# Patient Record
Sex: FEMALE | Race: WHITE
[De-identification: names, ages, dates, MRNs, and addresses within clinical notes are randomized per-mention and may not be internally consistent; named-entity substitution may affect disease eponyms.]

---

## 2019-07-11 ENCOUNTER — HOSPITAL ENCOUNTER (OUTPATIENT)
Dept: HOSPITAL 75 - PREOP | Age: 44
Discharge: HOME | End: 2019-07-11
Attending: SURGERY
Payer: COMMERCIAL

## 2019-07-11 DIAGNOSIS — Z01.818: Primary | ICD-10-CM

## 2019-07-24 ENCOUNTER — HOSPITAL ENCOUNTER (OUTPATIENT)
Dept: HOSPITAL 75 - ENDO | Age: 44
Discharge: HOME | End: 2019-07-24
Attending: SURGERY
Payer: COMMERCIAL

## 2019-07-24 VITALS — DIASTOLIC BLOOD PRESSURE: 84 MMHG | SYSTOLIC BLOOD PRESSURE: 134 MMHG

## 2019-07-24 VITALS — DIASTOLIC BLOOD PRESSURE: 74 MMHG | SYSTOLIC BLOOD PRESSURE: 128 MMHG

## 2019-07-24 VITALS — DIASTOLIC BLOOD PRESSURE: 79 MMHG | SYSTOLIC BLOOD PRESSURE: 129 MMHG

## 2019-07-24 VITALS — DIASTOLIC BLOOD PRESSURE: 86 MMHG | SYSTOLIC BLOOD PRESSURE: 118 MMHG

## 2019-07-24 VITALS — SYSTOLIC BLOOD PRESSURE: 134 MMHG | DIASTOLIC BLOOD PRESSURE: 74 MMHG

## 2019-07-24 VITALS — SYSTOLIC BLOOD PRESSURE: 130 MMHG | DIASTOLIC BLOOD PRESSURE: 77 MMHG

## 2019-07-24 VITALS — HEIGHT: 64 IN | BODY MASS INDEX: 26.46 KG/M2 | WEIGHT: 155 LBS

## 2019-07-24 VITALS — SYSTOLIC BLOOD PRESSURE: 143 MMHG | DIASTOLIC BLOOD PRESSURE: 93 MMHG

## 2019-07-24 VITALS — DIASTOLIC BLOOD PRESSURE: 87 MMHG | SYSTOLIC BLOOD PRESSURE: 132 MMHG

## 2019-07-24 VITALS — SYSTOLIC BLOOD PRESSURE: 145 MMHG | DIASTOLIC BLOOD PRESSURE: 79 MMHG

## 2019-07-24 VITALS — DIASTOLIC BLOOD PRESSURE: 65 MMHG | SYSTOLIC BLOOD PRESSURE: 127 MMHG

## 2019-07-24 VITALS — SYSTOLIC BLOOD PRESSURE: 118 MMHG | DIASTOLIC BLOOD PRESSURE: 77 MMHG

## 2019-07-24 VITALS — SYSTOLIC BLOOD PRESSURE: 129 MMHG | DIASTOLIC BLOOD PRESSURE: 69 MMHG

## 2019-07-24 VITALS — DIASTOLIC BLOOD PRESSURE: 78 MMHG | SYSTOLIC BLOOD PRESSURE: 131 MMHG

## 2019-07-24 VITALS — SYSTOLIC BLOOD PRESSURE: 116 MMHG | DIASTOLIC BLOOD PRESSURE: 71 MMHG

## 2019-07-24 VITALS — DIASTOLIC BLOOD PRESSURE: 66 MMHG | SYSTOLIC BLOOD PRESSURE: 131 MMHG

## 2019-07-24 VITALS — SYSTOLIC BLOOD PRESSURE: 130 MMHG | DIASTOLIC BLOOD PRESSURE: 84 MMHG

## 2019-07-24 VITALS — DIASTOLIC BLOOD PRESSURE: 78 MMHG | SYSTOLIC BLOOD PRESSURE: 132 MMHG

## 2019-07-24 VITALS — DIASTOLIC BLOOD PRESSURE: 77 MMHG | SYSTOLIC BLOOD PRESSURE: 130 MMHG

## 2019-07-24 DIAGNOSIS — F41.9: ICD-10-CM

## 2019-07-24 DIAGNOSIS — K29.70: ICD-10-CM

## 2019-07-24 DIAGNOSIS — K44.9: ICD-10-CM

## 2019-07-24 DIAGNOSIS — K52.9: ICD-10-CM

## 2019-07-24 DIAGNOSIS — K21.0: Primary | ICD-10-CM

## 2019-07-24 PROCEDURE — 88305 TISSUE EXAM BY PATHOLOGIST: CPT

## 2019-07-24 NOTE — XMS REPORT
Continuity of Care Document

                             Created on: 2019



ARNALDO YAO

External Reference #: [id number]

: 1975

Sex: Female



Demographics







                          Address                   [address] [place] [address]  [address]

 

                          Home Phone                [phone] x

 

                          Preferred Language        Unknown

 

                          Marital Status            Unknown

 

                          Confucianist Affiliation     Unknown

 

                          Race                      Unknown

 

                          Ethnic Group              Unknown





Author







                          Organization              Unknown

 

                          Address                   Unknown

 

                          Phone                     [phone]



              



Allergies

      





             Active              Description              Code              Type              Severity

                Reaction              Onset              Reported/Identified              Relationship

 to Patient                             Clinical Status        

 

             Yes              NO KNOWN DRUG ALLERGIES                                            UNKNOWN

                NO KNOWN DRUG ALLERG                                                 

                                                 

 

             Yes              NO KNOWN DRUG ALLERGIES                                            UNKNOWN

              UNKNOWN                                                               

    



                    



Medications

      



There is no data.                  



Problems

      





             Date Dx Coded              Attending              Type              Code              Diagnosis

                                        Diagnosed By        

 

             2017              Herberth Fry              308.9              UNSPECIFIED

 ACUTE REACTION TO STRESS                        

 

             2017              Herberth Fry              704.00              ALOPECIA,

 UNSPECIFIED                                     

 

             2017              Herberth Fry              F43.9              REACTION

 TO SEVERE STRESS, UNSPECIFIED                       

 

             2017              Herberth Fry              W              L65.9              NONSCARRING

 HAIR LOSS, UNSPECIFIED                          

 

             2017              Herberth Fry              W              308.9              UNSPECIFIED

 ACUTE REACTION TO STRESS                        

 

             2017              Herberth Fry              W              704.00              ALOPECIA,

 UNSPECIFIED                                     

 

             2017              Herberth Fry              W              F43.9              REACTION

 TO SEVERE STRESS, UNSPECIFIED                       

 

             2017              Herberth Fry              W              L65.9              NONSCARRING

 HAIR LOSS, UNSPECIFIED                          

 

             2017              Herberth Fry              W              308.9              UNSPECIFIED

 ACUTE REACTION TO STRESS                        

 

             2017              Herberth Fry              W              704.00              ALOPECIA,

 UNSPECIFIED                                     

 

             2017              Herberth Fry              F43.9              REACTION

 TO SEVERE STRESS, UNSPECIFIED                       

 

             2017              Herberth Fry              W              L65.9              NONSCARRING

 HAIR LOSS, UNSPECIFIED                          

 

             2017              Herberth Fry              W              308.9              UNSPECIFIED

 ACUTE REACTION TO STRESS                        

 

             2017              Herberth Fry              W              704.00              ALOPECIA,

 UNSPECIFIED                                     

 

             2017              Herberth Fry              F43.9              REACTION

 TO SEVERE STRESS, UNSPECIFIED                       

 

             2017              Herberth Fry              W              L65.9              NONSCARRING

 HAIR LOSS, UNSPECIFIED                          



                                                



Procedures

      



There is no data.                  



Results

      





                    Test                Result              Range        









                                        Mycoplasma - 17 14:47         









                    Mycoplasma              Negative               Negative        









                                        Thyroid Stimulating Hormone - 17 11:03         









                    TSH                 1.75 mIU/mL              0.32-5.00        









                                        Ferritin - 17 11:03         









                    Ferritin              59.85 ng/mL              4..00        









                                        MARISA w/Reflex  - 17 14:35         









                    MARISA DIRECT              NEGATIVE               NEGATIVE        









                                        Testosterone,Free and Total - 17 14:35         









                    FREE TESTOSTERONE(DIRECT)              0.4 PG/ML              0.0-4.2        

 

                    TESTOSTERONE, SERUM              19 NG/DL              8-48        









                                        C.difficile, DNA Amplification - 19 09:37         









                          C.difficile, DNA Amplification              NEGATIVE: No DNA evidence of toxogenic

 C. difficile detected.                 Negative        









                                        Stool Culture - 19 09:37         









                    CAMPYLOBACTER CULTURE              FINAL REPORT                        

 

                    E COLI SHIGA TOXIN EIA              NEGATIVE               NEGATIVE        

 

                    RESULT 1              NO SALMONELLA OR SHIGELLA RECOVERED.                        

 

                    Salmonella/Shigella Screen              FINAL REPORT                        

 

                    Result 1              NO CAMPYLOBACTER SPECIES ISOLATED.                        



                            



Encounters

      





                ACCT No.              Visit Date/Time              Discharge              Status      

                Pt. Type              Provider              Facility              Loc./Unit      

                                        Complaint        

 

                [id number]              2019 09:34:00              2019 23:59:00              

[facility]              Outpatient              Herberth Fry                                      

                                                 

 

                [id number]              2019 10:24:00              2019 10:24:00              

[facility]              Outpatient              Herberth Fry                                      

                                                 

 

                [id number]              2017 14:32:00              2017 23:59:00              

[facility]              Outpatient              Herberth Fry                                      

                                                 

 

                [id number]              2017 11:00:00              2017 23:59:00              

[facility]              Outpatient              Herberth Fry                                      

                                                 

 

                [id number]              2017 14:44:00              2017 23:59:00              

[facility]              Outpatient              Herberth Fry

## 2019-07-24 NOTE — DISCHARGE INST-SURGICAL
D/C Lap Instructions-KIDO


New, Converted, or Re-Newed RX:  RX on Chart


Follow Up





Activity as tolerated








High Fiber Diet 25g or more per day





Avoid Alcohol, Caffeine, Spicy Greasy and Acid foods.





Drink 64 fluid oz or more of fluids per day.





Symptoms to Report: Fever over 101 degree F, Nausea/Vomiting 


If any problems/questions: Contact your physician or go to Emergency Room











TIFFANY CM MD                Jul 24, 2019 10:57

## 2019-07-24 NOTE — OPERATIVE REPORT
DATE OF SERVICE:  07/24/2019



ATTENDING PRIMARY CARE PHYSICIAN:

Dr. Fry.



PREOPERATIVE DIAGNOSES:

Persistent diarrhea, abdominal bloating, reflux.



POSTOPERATIVE DIAGNOSES:

Reflux esophagitis, stage II.  Small hiatal hernia, 1.5 cm in size.  Mild to

moderate gastritis.  Colon and rectum were normal.



PROCEDURE:

EGD with biopsy, colonoscopy.



SURGEON:

Tiffany Perea MD.



ANESTHESIA:

Conscious sedation.



ESTIMATED BLOOD LOSS:

Minimal.



FINDINGS:

Reflux esophagitis, stage II.  Small hiatal hernia, 1.5 cm in size.  Mild to

moderate gastritis.  Colon and rectum were normal.



DISPOSITION:

The patient tolerated the procedure well.



INDICATIONS:

The patient is a 43-year-old female who has had crampy abdominal pain and

diarrhea for the past eight weeks.  She states that she went on Weight Watchers

in the month of May and then developed crampy abdominal pain and diarrhea.  She

also states that she also does take antibiotics for sinusitis approximately 3

times a year with azithromycin and her last round was approximately in April of

this year.  She did have stool cultures drawn, which were negative for

Clostridium difficile.  She does not report any red blood per rectum nor any

dark tarry stools.  She also has had crampy abdominal pain after eating meals

with abdominal bloating and reflux and regurgitation type of symptoms with

epigastric burning sensation.



DESCRIPTION OF PROCEDURE:

The patient was brought to the endoscopy suite, laid in left lateral decubitus

position.  After adequate IV pain and sedative medications and conscious

sedation anesthesia, the mouthpiece was applied.



Endoscope was placed in the mouth, visualizing the pharynx and hypopharyngeal

region.  Vocal cords, epiglottis and vallecula identified and appeared to be

normal.  The endoscope was then gently intubated at the esophageal opening and

esophagus insufflated.  The endoscope was then advanced to the first, second and

third portions of the esophagus at the level of the GE junction, a reflux

esophagitis, stage II identified.  There were no ulcers or strictures identified

in this region.  A biopsy was taken with forceps with visualization of good

hemostasis.  The endoscope was then easily advanced in the stomach and endoscope

retroflexed, visualizing a small hiatal hernia 1.5 cm in size.  There was a mild

to moderate gastritis and a biopsy was taken of the antrum to rule out H.

pylori.  The endoscope was then advanced to the pylorus and first and second

portion of the duodenum, which appeared normal and no distal obstructions.  The

endoscope was then slowly withdrawn while taking a second look and suctioning of

residual air with no additional findings.



The patient tolerated the procedure well.  We will recommend the necessary

lifestyle and diet accommodation including small and more frequent meals,

avoidance of eating at night as well as head elevation while lying supine.  She

also needs to avoid caffeinated beverages, spicy, greasy and acidic foods.  We

will also start her on trial of Protonix 40 mg daily.  If she has continued

symptoms of abdominal bloating and reflux despite maximal medical therapy, this

may be a gallbladder etiology and we will proceed with an ultrasound of the

gallbladder as well as a HIDA scan if necessary.



Under the same anesthesia, we then proceeded with colonoscopy portion of the

procedure.  A digital rectal examination was performed.  No significant

hemorrhoids identified.  Normal sphincter tone was felt and there were no

palpable masses.



The endoscope was then intubated to the anus and rectum gently insufflated.  The

endoscope was then advanced through the valves of Clarke and rectum with no

polyps or any neoplasms identified.  We then proceeded through the sigmoid colon

where no diverticulosis identified.  The endoscope was then advanced to the

remainder of the descending, transverse and ascending colon to the cecum.  These

segments were normal.  There were no polyps, neoplasms or any mucosal

inflammatory changes identified.  The endoscope was then slowly withdrawn while

taking a second look and suctioning of residual air with no additional findings.



The patient tolerated the procedure well.  We will recommend conservative

therapy for now with incorporation of a high fiber diet with at least 25 to 30

grams of fiber per day as well as significant amounts of water to promote soft

stools on a daily basis that is not liquid diarrhea; however, not hard or

well-formed.





Job ID: [id number]

DocumentID: [id number]

Dictated Date:  07/24/2019 12:30:57

Transcription Date: 07/24/2019 15:22:01

Dictated By: TIFFANY PEREA MD

## 2019-07-24 NOTE — PROGRESS NOTE-PRE OPERATIVE
Pre-Operative Progress Note


H&P Reviewed


The H&P was reviewed, patient examined and no changes noted.


Date Seen by Provider:  Jul 24, 2019


Time Seen by Provider:  10:00


Date H&P Reviewed:  Jul 24, 2019


Time H&P Reviewed:  10:00


Pre-Operative Diagnosis:  change bowel habits, GERD











TIFFANY CM MD                Jul 24, 2019 10:55

## 2019-07-24 NOTE — CONSCIOUS SEDATION/ASA
Conscious Sedation Pre-Proced


Time


10:00





ASA Score


2


For ASA 3 and 4: Consider anesthesia and medical clearance. Also, for patients

with a history of failed moderate sedation consider anesthesia.

















Airway 


 


Lungs 


 


Heart 


 


 ASA score


 


 ASA 1: a normal healthy patient


 


 ASA 2:  a patient with a mild systemic disease (mid diabetes, controlled 

hypertension, obesity 


 


 ASA 3:  a patient with a severe systemic disease that limits activity  (angina,

COPD, prior Myocardial infarction)


 


 ASA 4:  a patient with an incapacitating disease that is a constant threat to 

life (CHF, renal failure)


 


 ASA 5:  a moribund patient not expected to survive 24 hrs.  (ruptured aneurysm)


 


 ASA 6:  a declared brain-dead patient whose organs are being harvested.


 


 For emergent operations, add the letter E after the classification











Mallampati Classification


Grade 2





Sedation Plan


Analgesia, Amnesia, Plan communicated to team members, Discussed options with 

patient/fam, Discussed risks with patient/fam


The patient is an appropriate candidate to undergo the planned procedure, 

sedation, and anesthesia.





The patient immediately re-assessed prior to indication.











TIFFANY CM MD                Jul 24, 2019 10:55

## 2019-07-29 ENCOUNTER — HOSPITAL ENCOUNTER (OUTPATIENT)
Dept: HOSPITAL 75 - RAD | Age: 44
End: 2019-07-29
Attending: SURGERY
Payer: COMMERCIAL

## 2019-07-29 DIAGNOSIS — R14.0: ICD-10-CM

## 2019-07-29 DIAGNOSIS — R10.9: Primary | ICD-10-CM

## 2019-07-29 PROCEDURE — 76705 ECHO EXAM OF ABDOMEN: CPT

## 2019-07-29 NOTE — DIAGNOSTIC IMAGING REPORT
PROCEDURE: US Gallbladder.



TECHNIQUE: Multiple real-time grayscale images were obtained over

the right upper quadrant in various projections.



INDICATION:  Abdominal pain. Bloating.



COMPARISON:  None.



FINDINGS:  The liver is normal in size and shape.  The liver

echogenicity is within normal limits.  There are no focal

lesions. No intrahepatic biliary dilatation is present. The

common bile duct is not dilated and measures 4 mm. The main

portal vein is hepatopedal.



There is no evidence of cholelithiasis, gallbladder wall

thickening or pericholecystic fluid.  



The visualized pancreas is within normal limits.  The visualized

portions of the IVC and aorta appear normal.



The right kidney measures approximately 10.9 cm in length and has

a normal appearance.



IMPRESSION:  

1. No cholelithiasis or acute cholecystitis. No liver or

gallbladder abnormality detected.



Dictated by: 



  Dictated on workstation # [id number]

## 2019-08-15 ENCOUNTER — HOSPITAL ENCOUNTER (OUTPATIENT)
Dept: HOSPITAL 75 - CARD | Age: 44
End: 2019-08-15
Attending: SURGERY
Payer: COMMERCIAL

## 2019-08-15 DIAGNOSIS — R10.11: Primary | ICD-10-CM

## 2019-08-15 DIAGNOSIS — R14.0: ICD-10-CM

## 2019-08-15 PROCEDURE — 78227 HEPATOBIL SYST IMAGE W/DRUG: CPT

## 2019-08-23 ENCOUNTER — HOSPITAL ENCOUNTER (OUTPATIENT)
Dept: HOSPITAL 75 - PREOP | Age: 44
End: 2019-08-23
Attending: SURGERY
Payer: COMMERCIAL

## 2019-08-23 VITALS — WEIGHT: 155 LBS | HEIGHT: 64 IN | BODY MASS INDEX: 26.46 KG/M2

## 2019-08-23 DIAGNOSIS — K82.8: ICD-10-CM

## 2019-08-23 DIAGNOSIS — Z01.818: Primary | ICD-10-CM

## 2019-08-29 ENCOUNTER — HOSPITAL ENCOUNTER (OUTPATIENT)
Dept: HOSPITAL 75 - SDC | Age: 44
Discharge: HOME | End: 2019-08-29
Attending: SURGERY
Payer: COMMERCIAL

## 2019-08-29 VITALS — SYSTOLIC BLOOD PRESSURE: 147 MMHG | DIASTOLIC BLOOD PRESSURE: 81 MMHG

## 2019-08-29 VITALS — DIASTOLIC BLOOD PRESSURE: 94 MMHG | SYSTOLIC BLOOD PRESSURE: 151 MMHG

## 2019-08-29 VITALS — SYSTOLIC BLOOD PRESSURE: 156 MMHG | DIASTOLIC BLOOD PRESSURE: 90 MMHG

## 2019-08-29 VITALS — BODY MASS INDEX: 26.46 KG/M2 | WEIGHT: 155 LBS | HEIGHT: 64 IN

## 2019-08-29 VITALS — DIASTOLIC BLOOD PRESSURE: 94 MMHG | SYSTOLIC BLOOD PRESSURE: 131 MMHG

## 2019-08-29 VITALS — SYSTOLIC BLOOD PRESSURE: 136 MMHG | DIASTOLIC BLOOD PRESSURE: 88 MMHG

## 2019-08-29 VITALS — SYSTOLIC BLOOD PRESSURE: 140 MMHG | DIASTOLIC BLOOD PRESSURE: 86 MMHG

## 2019-08-29 VITALS — SYSTOLIC BLOOD PRESSURE: 120 MMHG | DIASTOLIC BLOOD PRESSURE: 82 MMHG

## 2019-08-29 VITALS — SYSTOLIC BLOOD PRESSURE: 127 MMHG | DIASTOLIC BLOOD PRESSURE: 90 MMHG

## 2019-08-29 VITALS — SYSTOLIC BLOOD PRESSURE: 136 MMHG | DIASTOLIC BLOOD PRESSURE: 79 MMHG

## 2019-08-29 VITALS — DIASTOLIC BLOOD PRESSURE: 78 MMHG | SYSTOLIC BLOOD PRESSURE: 137 MMHG

## 2019-08-29 DIAGNOSIS — K82.8: ICD-10-CM

## 2019-08-29 DIAGNOSIS — Z82.49: ICD-10-CM

## 2019-08-29 DIAGNOSIS — K81.1: Primary | ICD-10-CM

## 2019-08-29 DIAGNOSIS — Z90.49: ICD-10-CM

## 2019-08-29 DIAGNOSIS — F41.9: ICD-10-CM

## 2019-08-29 DIAGNOSIS — Z79.899: ICD-10-CM

## 2019-08-29 DIAGNOSIS — K21.9: ICD-10-CM

## 2019-08-29 DIAGNOSIS — Z79.891: ICD-10-CM

## 2019-08-29 LAB
BASOPHILS # BLD AUTO: 0 10^3/UL (ref 0–0.1)
BASOPHILS NFR BLD AUTO: 0 % (ref 0–10)
EOSINOPHIL # BLD AUTO: 0.3 10^3/UL (ref 0–0.3)
EOSINOPHIL NFR BLD AUTO: 5 % (ref 0–10)
ERYTHROCYTE [DISTWIDTH] IN BLOOD BY AUTOMATED COUNT: 12.9 % (ref 10–14.5)
HCT VFR BLD CALC: 42 % (ref 35–52)
HGB BLD-MCNC: 14.9 G/DL (ref 11.5–16)
LYMPHOCYTES # BLD AUTO: 1.4 X 10^3 (ref 1–4)
LYMPHOCYTES NFR BLD AUTO: 21 % (ref 12–44)
MANUAL DIFFERENTIAL PERFORMED BLD QL: NO
MCH RBC QN AUTO: 30 PG (ref 25–34)
MCHC RBC AUTO-ENTMCNC: 36 G/DL (ref 32–36)
MCV RBC AUTO: 84 FL (ref 80–99)
MONOCYTES # BLD AUTO: 0.5 X 10^3 (ref 0–1)
MONOCYTES NFR BLD AUTO: 7 % (ref 0–12)
NEUTROPHILS # BLD AUTO: 4.5 X 10^3 (ref 1.8–7.8)
NEUTROPHILS NFR BLD AUTO: 68 % (ref 42–75)
PLATELET # BLD: 256 10^3/UL (ref 130–400)
PMV BLD AUTO: 10.3 FL (ref 7.4–10.4)
WBC # BLD AUTO: 6.7 10^3/UL (ref 4.3–11)

## 2019-08-29 PROCEDURE — 85025 COMPLETE CBC W/AUTO DIFF WBC: CPT

## 2019-08-29 PROCEDURE — 88304 TISSUE EXAM BY PATHOLOGIST: CPT

## 2019-08-29 PROCEDURE — 87081 CULTURE SCREEN ONLY: CPT

## 2019-08-29 PROCEDURE — 84703 CHORIONIC GONADOTROPIN ASSAY: CPT

## 2019-08-29 PROCEDURE — 36415 COLL VENOUS BLD VENIPUNCTURE: CPT

## 2019-08-29 NOTE — OPERATIVE REPORT
DATE OF SERVICE:  08/29/2019



ATTENDING PRIMARY CARE PHYSICIAN:

Herberth Fry DO.



PREOPERATIVE DIAGNOSIS:

Symptomatic biliary dyskinesia.



POSTOPERATIVE DIAGNOSIS:

Symptomatic biliary dyskinesia.



PROCEDURE:

Laparoscopic cholecystectomy.



SURGEON:

Tiffany Perea MD.



ASSISTANT:

BYRON Mccoy.



ANESTHESIA:

General endotracheal.



ESTIMATED BLOOD LOSS:

Minimal.



FINDINGS:

Mild dilatation of the gallbladder.  Cholesterolosis.



DISPOSITION:

The patient tolerated the procedure well.



INDICATIONS:

The patient is a 43-year-old female who developed abdominal bloating and

diarrhea as well as crampy abdominal pain starting in 05/2019.  She states that

she went on Weight Watchers and did lose a significant amount of weight at that

time.  She reported that her symptoms were much more apparent after eating

meals.  She underwent an ultrasound, which did not show any gallstones; however,

further investigation with a HIDA scan did show low ejection fraction of 10.5%

consistent with a symptomatic biliary dyskinesia.



DESCRIPTION OF PROCEDURE:

The patient was brought to the operating room, laid supine on the table.  After

adequate IV pain and sedative medications and general endotracheal intubation,

the abdomen was prepped and draped in standard surgical fashion.



A 0.5% Marcaine with epinephrine was then used to anesthetize the overlying skin

in the left upper abdominal quadrant and a transverse skin incision made using a

15 blade.  An 0 silk suture was applied to the medial aspect of incision for

traction and a Veress needle inserted with a low opening pressure of 0 mmHg and

the abdomen was then insufflated to 15 mmHg pressure.  The Veress needle removed

and a 5 mm Xcel trocar placed followed by a 5 mm 45-degree angle laparoscope

visualizing the peritoneal cavity.



A 4-quadrant abdominal exploration was performed.  There was a slightly

distended gallbladder with no gallbladder wall inflammation.  The liver,

stomach, small bowel appeared normal.  Under direct visualization, we then

proceeded to place a supraumbilical 10 mm port after the skin and peritoneal

lining were anesthetized using 0.5% Marcaine with epinephrine and a transverse

skin incision made using a 15 blade.  In a similar manner, a right upper

abdominal quadrant 5 mm port was placed.



The patient was then placed in reverse Trendelenburg position as well as plane

right side up, left side down.  The fundus of the gallbladder was then retracted

anteriorly and superiorly.  The hepatoduodenal ligament was then opened using

blunt dissection as well as electrocautery using the hook instrument.  The

entire critical view of safety was identified including the triangle of Calot as

well as the cystic duct and artery as only two structures going into the

gallbladder as well as the cystic plate behind the proximal gallbladder.  A

timeout was then taken and the cystic duct and artery were then clipped

proximally and distally and cut with EndoShears.  The gallbladder was dissected

off the liver bed using cautery on the hook instrument with visualization of

good hemostasis as well as no leaking ducts of Luschka.  The gallbladder was

removed through the 10 mm port site using an EndoCatch bag.



The 10 mm port site fascia and peritoneum were then closed under direct

visualization using a Fermín-Aliyah device and 0 Vicryl suture.  The abdomen

was then desufflated and remaining ports removed.  All skin incisions were

closed using 4-0 Monocryl running subcuticular sutures.  Wounds were then

cleaned and covered with Dermabond.



The patient tolerated the procedure well.  We will start IV normal pain

medication as well as a clear liquid diet.  When she is tolerating clears and

has good pain control with oral pain medications, ambulating well, we will

discharge her home. She will also be instructed to avoid lifting and exertion

for the next two weeks.





Job ID: [id number]

DocumentID: [id number]

Dictated Date:  08/29/2019 10:17:36

Transcription Date: 08/29/2019 14:54:21

Dictated By: TIFFANY PEREA MD

## 2019-08-29 NOTE — PROGRESS NOTE-PRE OPERATIVE
Pre-Operative Progress Note


H&P Reviewed


The H&P was reviewed, patient examined and no changes noted.


Date Seen by Provider:  Aug 29, 2019


Time Seen by Provider:  08:30


Date H&P Reviewed:  Aug 29, 2019


Time H&P Reviewed:  08:25


Pre-Operative Diagnosis:  Chronic calculous cholecystitis











JENNIE FLORES          Aug 29, 2019 08:34

## 2019-08-29 NOTE — ANESTHESIA-GENERAL POST-OP
General


Patient Condition


Mental Status/LOC:  Same as Preop


Cardiovascular:  Satisfactory


Nausea/Vomiting:  Absent


Respiratory:  Satisfactory


Pain:  Controlled


Complications:  Absent





Post Op Complications


Complications


None





Follow Up Care/Instructions


Patient Instructions


None needed.





Anesthesia/Patient Condition


Patient Condition


Patient is doing well, no complaints, stable vital signs, no apparent adverse 

anesthesia problems.   


No complications reported per nursing.











ESTEFANIA KAPOOR CRNA            Aug 29, 2019 12:20

## 2019-08-29 NOTE — DISCHARGE INST-SURGICAL
D/C Lap Instructions-KIDO


Reconcile Patient Problems


Problems Reviewed?:  Yes


New, Converted, or Re-Newed RX:  RX on Chart


Follow Up Appt in 2 weeks





Activity as tolerated


No driving for 24 hours


No driving while on pain medications





Incentive Spirometry use every 2 hours while awake





Regular Diet





Symptoms to Report: Fever over 101 degree F, Nausea/Vomiting 


Infection Signs and Symptoms to report:  Increased redness, Foul odor of wound, 

Increased drainage





Bathing instructions: May shower


Operative Area Clean/Dry;  Keep incision clean/dry





If any problems/questions: Contact your physician or go to Emergency Room











JENNIE FLORES          Aug 29, 2019 08:37

## 2019-08-29 NOTE — PROGRESS NOTE-POST OPERATIVE
Post-Operative Progess Note


Surgeon (s)/Assistant (s)


Surgeon


TIFFANY CM MD


Assistant:  lawrence key APRN





Pre-Operative Diagnosis


Chronic calculous cholecystitis





Post-Operative Diagnosis





same





Procedure & Operative Findings


Date of Procedure


8/29/19


Procedure Performed/Findings


laparoscopic cholecystectomy


Anesthesia Type


get





Estimated Blood Loss


Estimated blood loss (mL):  minimal





Specimens/Packing


Specimens Removed


none











TIFFANY CM MD                Aug 29, 2019 10:14

## 2024-07-08 NOTE — DIAGNOSTIC IMAGING REPORT
INDICATION: Right upper quadrant abdominal pain.



TECHNIQUE: Nuclear hepatobiliary study performed in the routine

fashion with intravenous injection of 5.34 mCi of technetium-99m

Choletec.



FINDINGS: There is prompt uptake of the tracer by the liver.

Tracer is visualized in the biliary tree within 15 minutes.

Tracer is visualized in the gallbladder within 15 minutes. Tracer

is visualized in the small bowel within 1 hour.



The patient was then given Ensure orally. The gallbladder

ejection fraction was calculated. The calculated gallbladder

ejection fraction was 10.5 percent.



IMPRESSION: No evidence of cystic duct or common duct

obstruction. Diminished gallbladder ejection fraction of 10.5% is

compatible with biliary dyskinesia.



Dictated by: 



  Dictated on workstation # [id number] Requested Prescriptions     Pending Prescriptions Disp Refills    FARXIGA 10 MG tablet [Pharmacy Med Name: FARXIGA 10 MG TABLET] 30 tablet 4     Sig: Take 1 tablet by mouth daily     Last appt 12/2023  Next appt 12/2024 (yearly)